# Patient Record
Sex: MALE | HISPANIC OR LATINO
[De-identification: names, ages, dates, MRNs, and addresses within clinical notes are randomized per-mention and may not be internally consistent; named-entity substitution may affect disease eponyms.]

---

## 2020-03-05 PROBLEM — Z00.00 ENCOUNTER FOR PREVENTIVE HEALTH EXAMINATION: Status: ACTIVE | Noted: 2020-03-05

## 2020-03-09 ENCOUNTER — APPOINTMENT (OUTPATIENT)
Dept: UROLOGY | Facility: CLINIC | Age: 60
End: 2020-03-09
Payer: MEDICAID

## 2020-03-09 VITALS
BODY MASS INDEX: 23.7 KG/M2 | RESPIRATION RATE: 16 BRPM | SYSTOLIC BLOOD PRESSURE: 121 MMHG | HEIGHT: 69 IN | HEART RATE: 71 BPM | WEIGHT: 160 LBS | DIASTOLIC BLOOD PRESSURE: 83 MMHG

## 2020-03-09 DIAGNOSIS — Z78.9 OTHER SPECIFIED HEALTH STATUS: ICD-10-CM

## 2020-03-09 DIAGNOSIS — Z87.19 PERSONAL HISTORY OF OTHER DISEASES OF THE DIGESTIVE SYSTEM: ICD-10-CM

## 2020-03-09 DIAGNOSIS — R82.90 UNSPECIFIED ABNORMAL FINDINGS IN URINE: ICD-10-CM

## 2020-03-09 DIAGNOSIS — J45.909 UNSPECIFIED ASTHMA, UNCOMPLICATED: ICD-10-CM

## 2020-03-09 DIAGNOSIS — Z56.0 UNEMPLOYMENT, UNSPECIFIED: ICD-10-CM

## 2020-03-09 LAB
BILIRUB UR QL STRIP: NORMAL
CLARITY UR: CLEAR
COLLECTION METHOD: NORMAL
GLUCOSE UR-MCNC: NORMAL
HCG UR QL: 0.2 EU/DL
HGB UR QL STRIP.AUTO: ABNORMAL
KETONES UR-MCNC: NORMAL
LEUKOCYTE ESTERASE UR QL STRIP: ABNORMAL
NITRITE UR QL STRIP: NORMAL
PH UR STRIP: 6
PROT UR STRIP-MCNC: NORMAL
SP GR UR STRIP: 1.02

## 2020-03-09 PROCEDURE — 99203 OFFICE O/P NEW LOW 30 MIN: CPT | Mod: 25

## 2020-03-09 PROCEDURE — 81003 URINALYSIS AUTO W/O SCOPE: CPT | Mod: QW

## 2020-03-09 RX ORDER — ALBUTEROL SULFATE 90 UG/1
108 (90 BASE) AEROSOL, METERED RESPIRATORY (INHALATION)
Refills: 0 | Status: ACTIVE | COMMUNITY
Start: 2020-03-09

## 2020-03-09 SDOH — ECONOMIC STABILITY - INCOME SECURITY: UNEMPLOYMENT, UNSPECIFIED: Z56.0

## 2020-03-09 NOTE — HISTORY OF PRESENT ILLNESS
[Urinary Incontinence] : urinary incontinence [None] : None [Erectile Dysfunction] : Erectile Dysfunction [FreeTextEntry1] : With c/o incontinence with activity since 2016, s/p Prostatectomy (Dr. Richard, [facility]) for prostate cancer. Was set up for sling surgery 1/2020 with Dr. Edgar Wilson but due to insurance change provider no longer participated with his insurance. Received radiation and ADT with Lupron ending 2018. Last PSA <0.05 ng/mL 3/5/2018, Testosterone 480 ng,dL 9/12/2019. Using pads 4-5/day, nocturia 2x/night depending on fluid intake. With ED, not on treatment. Was also suppose to have penile implant along with sling surgery. Tried Cialis, not effective and self injection therapy which was effective but uncomfortable. \par \par Being followed at [facility], has appt 3/13/2020\par \par Patient's man complaint's are post prostatectomy ED and post prostatectomy incontinence.

## 2020-03-09 NOTE — LETTER BODY
[Dear  ___] : Dear  [unfilled], [Courtesy Letter:] : I had the pleasure of seeing your patient, [unfilled], in my office today. [Please see my note below.] : Please see my note below. [Sincerely,] : Sincerely, [FreeTextEntry3] : Ed\par \par Maxim Michelle MD\par [facility]\par  of Urology\par [employer] at [facility]\par

## 2020-03-09 NOTE — PHYSICAL EXAM
[General Appearance - Well Developed] : well developed [General Appearance - Well Nourished] : well nourished [Normal Appearance] : normal appearance [Well Groomed] : well groomed [General Appearance - In No Acute Distress] : no acute distress [Edema] : no peripheral edema [Respiration, Rhythm And Depth] : normal respiratory rhythm and effort [Exaggerated Use Of Accessory Muscles For Inspiration] : no accessory muscle use [Normal Station and Gait] : the gait and station were normal for the patient's age [Skin Color & Pigmentation] : normal skin color and pigmentation [] : no rash [Oriented To Time, Place, And Person] : oriented to person, place, and time [Affect] : the affect was normal [Mood] : the mood was normal [Not Anxious] : not anxious [Urethral Meatus] : meatus normal [Penis Abnormality] : normal uncircumcised penis [Epididymis] : the epididymides were normal [Testes Tenderness] : no tenderness of the testes [Anus Abnormality] : the anus and perineum were normal [Rectal Exam - Rectum] : digital rectal exam was normal [FreeTextEntry1] : prostate absent, no masses in rectal vault

## 2020-03-09 NOTE — ASSESSMENT
[FreeTextEntry1] : Incontinence\par \par Refer to Dr. Silva\par Bring medical records to appt: pathology report, recent PSA\par Likely will need cysto and possible urodynamics. \par \par ED\par \par Will address after incontinence work up.  Suspect he will require prosthesis.

## 2020-03-12 DIAGNOSIS — Z87.440 PERSONAL HISTORY OF URINARY (TRACT) INFECTIONS: ICD-10-CM

## 2020-03-12 LAB — BACTERIA UR CULT: ABNORMAL

## 2020-08-31 ENCOUNTER — APPOINTMENT (OUTPATIENT)
Dept: UROLOGY | Facility: CLINIC | Age: 60
End: 2020-08-31

## 2020-11-23 ENCOUNTER — APPOINTMENT (OUTPATIENT)
Dept: UROLOGY | Facility: CLINIC | Age: 60
End: 2020-11-23
Payer: MEDICAID

## 2020-11-23 VITALS
TEMPERATURE: 97.6 F | BODY MASS INDEX: 23.7 KG/M2 | HEIGHT: 69 IN | WEIGHT: 160 LBS | DIASTOLIC BLOOD PRESSURE: 71 MMHG | SYSTOLIC BLOOD PRESSURE: 132 MMHG

## 2020-11-23 DIAGNOSIS — R32 UNSPECIFIED URINARY INCONTINENCE: ICD-10-CM

## 2020-11-23 PROCEDURE — 99214 OFFICE O/P EST MOD 30 MIN: CPT

## 2020-11-23 RX ORDER — SULFAMETHOXAZOLE AND TRIMETHOPRIM 800; 160 MG/1; MG/1
800-160 TABLET ORAL TWICE DAILY
Qty: 6 | Refills: 0 | Status: DISCONTINUED | COMMUNITY
Start: 2020-03-12 | End: 2020-11-23

## 2020-11-23 RX ORDER — ATORVASTATIN CALCIUM 20 MG/1
20 TABLET, FILM COATED ORAL
Qty: 90 | Refills: 0 | Status: ACTIVE | COMMUNITY
Start: 2020-09-08

## 2020-11-23 NOTE — HISTORY OF PRESENT ILLNESS
[1] : 1 [FreeTextEntry1] : 61 y/o male with h/p prostate cancer in 2016, s/p prostatectomy, irradiation and hormonal therapy, h/o hematuria s/p cystoscopy and fulguration in 2019, today, c/o gross hematuria.started about 2 months ago. no dysuria. no frequency. Urgency and urge incontinence since prostatectomy. no weight loss.

## 2020-11-23 NOTE — LETTER BODY
[Dear  ___] : Dear  [unfilled], [Courtesy Letter:] : I had the pleasure of seeing your patient, [unfilled], in my office today. [Please see my note below.] : Please see my note below. [Sincerely,] : Sincerely, [FreeTextEntry3] : Ed\par \par Maxim Michelle MD\par [facility]\par  of Urology\par [employer] at [facility]\par

## 2020-11-23 NOTE — ASSESSMENT
[FreeTextEntry1] : Impression:\par \par gross hematuria\par urge incontinence\par radiation cystitis\par \par Plan":\par oxybutynin\par cysto\par renal ultrasound. \par

## 2020-11-23 NOTE — HISTORY OF PRESENT ILLNESS
[1] : 1 [FreeTextEntry1] : 59 y/o male with h/p prostate cancer in 2016, s/p prostatectomy, irradiation and hormonal therapy, h/o hematuria s/p cystoscopy and fulguration in 2019, today, c/o gross hematuria.started about 2 months ago. no dysuria. no frequency. Urgency and urge incontinence since prostatectomy. no weight loss.

## 2020-11-23 NOTE — PHYSICAL EXAM
[General Appearance - Well Developed] : well developed [General Appearance - Well Nourished] : well nourished [General Appearance - In No Acute Distress] : no acute distress [Abdomen Soft] : soft [Abdomen Tenderness] : non-tender [Costovertebral Angle Tenderness] : no ~M costovertebral angle tenderness [Skin Color & Pigmentation] : normal skin color and pigmentation [] : no respiratory distress [Respiration, Rhythm And Depth] : normal respiratory rhythm and effort [Exaggerated Use Of Accessory Muscles For Inspiration] : no accessory muscle use [Oriented To Time, Place, And Person] : oriented to person, place, and time [Affect] : the affect was normal [Mood] : the mood was normal [Not Anxious] : not anxious [Normal Station and Gait] : the gait and station were normal for the patient's age

## 2020-11-23 NOTE — LETTER BODY
[Dear  ___] : Dear  [unfilled], [Courtesy Letter:] : I had the pleasure of seeing your patient, [unfilled], in my office today. [Please see my note below.] : Please see my note below. [Sincerely,] : Sincerely, [FreeTextEntry3] : Ed\par \par Maxim Michelle MD\par [facility]\par  of Urology\par [employer] at [facility]\par

## 2020-12-03 ENCOUNTER — APPOINTMENT (OUTPATIENT)
Dept: ULTRASOUND IMAGING | Facility: CLINIC | Age: 60
End: 2020-12-03
Payer: MEDICAID

## 2020-12-03 ENCOUNTER — OUTPATIENT (OUTPATIENT)
Dept: OUTPATIENT SERVICES | Facility: HOSPITAL | Age: 60
LOS: 1 days | End: 2020-12-03
Payer: MEDICAID

## 2020-12-03 DIAGNOSIS — R31.0 GROSS HEMATURIA: ICD-10-CM

## 2020-12-03 PROCEDURE — 76775 US EXAM ABDO BACK WALL LIM: CPT | Mod: 26

## 2020-12-03 PROCEDURE — 76775 US EXAM ABDO BACK WALL LIM: CPT

## 2020-12-14 ENCOUNTER — APPOINTMENT (OUTPATIENT)
Dept: UROLOGY | Facility: CLINIC | Age: 60
End: 2020-12-14
Payer: MEDICAID

## 2020-12-14 VITALS
DIASTOLIC BLOOD PRESSURE: 89 MMHG | HEART RATE: 79 BPM | RESPIRATION RATE: 15 BRPM | SYSTOLIC BLOOD PRESSURE: 145 MMHG | TEMPERATURE: 97.9 F

## 2020-12-14 DIAGNOSIS — R31.0 GROSS HEMATURIA: ICD-10-CM

## 2020-12-14 PROCEDURE — 99072 ADDL SUPL MATRL&STAF TM PHE: CPT

## 2020-12-14 PROCEDURE — 52000 CYSTOURETHROSCOPY: CPT

## 2020-12-23 PROBLEM — Z87.440 HISTORY OF URINARY TRACT INFECTION: Status: RESOLVED | Noted: 2020-03-12 | Resolved: 2020-12-23

## 2021-02-22 ENCOUNTER — RX RENEWAL (OUTPATIENT)
Age: 61
End: 2021-02-22

## 2021-03-15 ENCOUNTER — APPOINTMENT (OUTPATIENT)
Dept: UROLOGY | Facility: CLINIC | Age: 61
End: 2021-03-15
Payer: MEDICAID

## 2021-03-15 VITALS
HEIGHT: 69 IN | DIASTOLIC BLOOD PRESSURE: 90 MMHG | RESPIRATION RATE: 16 BRPM | HEART RATE: 96 BPM | TEMPERATURE: 98 F | WEIGHT: 160 LBS | SYSTOLIC BLOOD PRESSURE: 127 MMHG | OXYGEN SATURATION: 95 % | BODY MASS INDEX: 23.7 KG/M2

## 2021-03-15 DIAGNOSIS — N52.9 MALE ERECTILE DYSFUNCTION, UNSPECIFIED: ICD-10-CM

## 2021-03-15 DIAGNOSIS — Z85.46 PERSONAL HISTORY OF MALIGNANT NEOPLASM OF PROSTATE: ICD-10-CM

## 2021-03-15 DIAGNOSIS — N30.40 IRRADIATION CYSTITIS W/OUT HEMATURIA: ICD-10-CM

## 2021-03-15 PROCEDURE — 99213 OFFICE O/P EST LOW 20 MIN: CPT

## 2021-03-15 PROCEDURE — 99072 ADDL SUPL MATRL&STAF TM PHE: CPT

## 2021-03-15 RX ORDER — OMEPRAZOLE 40 MG/1
40 CAPSULE, DELAYED RELEASE ORAL
Qty: 30 | Refills: 0 | Status: ACTIVE | COMMUNITY
Start: 2021-02-03

## 2021-03-15 RX ORDER — CHOLECALCIFEROL (VITAMIN D3) 1250 MCG
1.25 MG CAPSULE ORAL
Qty: 4 | Refills: 0 | Status: ACTIVE | COMMUNITY
Start: 2021-02-08

## 2021-03-15 NOTE — HISTORY OF PRESENT ILLNESS
[FreeTextEntry1] : Patient had just finished hyperbaric oxygen.  Patient has a medium stream.  no feeling of fullness. no urgency. no dysuria\par .

## 2021-03-15 NOTE — LETTER BODY
[Dear  ___] : Dear  [unfilled], [Courtesy Letter:] : I had the pleasure of seeing your patient, [unfilled], in my office today. [Please see my note below.] : Please see my note below. [Sincerely,] : Sincerely, [FreeTextEntry3] : Ed\par \par Maxim Michelle MD\par [facility]\par  of Urology\par [employer] at [facility]\par

## 2021-07-23 ENCOUNTER — RX RENEWAL (OUTPATIENT)
Age: 61
End: 2021-07-23

## 2021-07-23 RX ORDER — OXYBUTYNIN CHLORIDE 5 MG/1
5 TABLET ORAL
Qty: 90 | Refills: 3 | Status: ACTIVE | COMMUNITY
Start: 2020-11-23 | End: 1900-01-01

## 2021-12-22 RX ORDER — TADALAFIL 20 MG/1
20 TABLET ORAL
Qty: 30 | Refills: 5 | Status: ACTIVE | COMMUNITY
Start: 2021-03-15 | End: 1900-01-01

## 2023-02-16 ENCOUNTER — NON-APPOINTMENT (OUTPATIENT)
Age: 63
End: 2023-02-16

## 2023-02-21 ENCOUNTER — APPOINTMENT (OUTPATIENT)
Dept: UROLOGY | Facility: CLINIC | Age: 63
End: 2023-02-21

## 2025-08-11 ENCOUNTER — OFFICE (OUTPATIENT)
Dept: URBAN - METROPOLITAN AREA CLINIC 1 | Facility: CLINIC | Age: 65
Setting detail: OPHTHALMOLOGY
End: 2025-08-11
Payer: MEDICARE

## 2025-08-11 DIAGNOSIS — H25.13: ICD-10-CM

## 2025-08-11 DIAGNOSIS — H52.4: ICD-10-CM

## 2025-08-11 DIAGNOSIS — H40.013: ICD-10-CM

## 2025-08-11 PROCEDURE — 92020 GONIOSCOPY: CPT | Performed by: OPHTHALMOLOGY

## 2025-08-11 PROCEDURE — 92004 COMPRE OPH EXAM NEW PT 1/>: CPT | Performed by: OPHTHALMOLOGY

## 2025-08-11 PROCEDURE — 92015 DETERMINE REFRACTIVE STATE: CPT | Performed by: OPHTHALMOLOGY

## 2025-08-11 PROCEDURE — 92250 FUNDUS PHOTOGRAPHY W/I&R: CPT | Performed by: OPHTHALMOLOGY

## 2025-08-11 ASSESSMENT — REFRACTION_MANIFEST
OS_ADD: +2.50
OS_AXIS: 090
OD_ADD: +2.50
OS_CYLINDER: -0.75
OD_SPHERE: +1.25
OS_CYLINDER: -0.50
OD_VA1: 20/20-1
OD_CYLINDER: -0.25
OD_AXIS: 080
OS_ADD: +2.50
OS_SPHERE: +1.50
OS_AXIS: 090
OS_SPHERE: +1.50
OD_SPHERE: +1.50
OS_VA1: 20/20-1
OD_ADD: +2.50
OS_VA1: 20/20-1
OD_VA1: 20/20-1
OD_CYLINDER: SPHERE

## 2025-08-11 ASSESSMENT — REFRACTION_CURRENTRX
OS_SPHERE: +1.00
OD_SPHERE: +1.25
OS_VPRISM_DIRECTION: PROGS
OD_ADD: +2.00
OD_VPRISM_DIRECTION: PROGS
OD_OVR_VA: 20/
OS_ADD: +2.00
OS_OVR_VA: 20/

## 2025-08-11 ASSESSMENT — REFRACTION_AUTOREFRACTION
OS_AXIS: 090
OS_CYLINDER: -0.75
OD_AXIS: 080
OS_SPHERE: +1.50
OD_CYLINDER: -0.25
OD_SPHERE: +1.50

## 2025-08-11 ASSESSMENT — VISUAL ACUITY
OS_BCVA: 20/20
OD_BCVA: 20/20

## 2025-08-11 ASSESSMENT — KERATOMETRY
OS_K1POWER_DIOPTERS: 42.75
OD_K2POWER_DIOPTERS: 42.25
OD_AXISANGLE_DEGREES: 066
OD_K1POWER_DIOPTERS: 42.00
OS_K2POWER_DIOPTERS: 43.00
OS_AXISANGLE_DEGREES: 127

## 2025-08-11 ASSESSMENT — CONFRONTATIONAL VISUAL FIELD TEST (CVF)
OS_FINDINGS: FULL
OD_FINDINGS: FULL

## 2025-08-11 ASSESSMENT — TONOMETRY
OS_IOP_MMHG: 13
OD_IOP_MMHG: 13